# Patient Record
Sex: FEMALE | Race: WHITE | NOT HISPANIC OR LATINO | Employment: FULL TIME | URBAN - METROPOLITAN AREA
[De-identification: names, ages, dates, MRNs, and addresses within clinical notes are randomized per-mention and may not be internally consistent; named-entity substitution may affect disease eponyms.]

---

## 2024-11-22 ENCOUNTER — HOSPITAL ENCOUNTER (OUTPATIENT)
Facility: HOSPITAL | Age: 44
Discharge: HOME | End: 2024-11-22
Attending: EMERGENCY MEDICINE | Admitting: EMERGENCY MEDICINE
Payer: MEDICAID

## 2024-11-22 ENCOUNTER — APPOINTMENT (OUTPATIENT)
Dept: GENERAL RADIOLOGY | Facility: HOSPITAL | Age: 44
End: 2024-11-22
Payer: MEDICAID

## 2024-11-22 VITALS
BODY MASS INDEX: 24.91 KG/M2 | TEMPERATURE: 99 F | OXYGEN SATURATION: 98 % | WEIGHT: 155 LBS | RESPIRATION RATE: 20 BRPM | SYSTOLIC BLOOD PRESSURE: 115 MMHG | HEART RATE: 117 BPM | DIASTOLIC BLOOD PRESSURE: 79 MMHG | HEIGHT: 66 IN

## 2024-11-22 DIAGNOSIS — J06.9 VIRAL URI WITH COUGH: Primary | ICD-10-CM

## 2024-11-22 LAB
FLUAV SUBTYP SPEC NAA+PROBE: NOT DETECTED
FLUBV RNA ISLT QL NAA+PROBE: NOT DETECTED
SARS-COV-2 RNA RESP QL NAA+PROBE: NOT DETECTED
STREP A PCR: NOT DETECTED

## 2024-11-22 PROCEDURE — 71046 X-RAY EXAM CHEST 2 VIEWS: CPT

## 2024-11-22 PROCEDURE — 87651 STREP A DNA AMP PROBE: CPT

## 2024-11-22 PROCEDURE — 87636 SARSCOV2 & INF A&B AMP PRB: CPT | Performed by: EMERGENCY MEDICINE

## 2024-11-22 PROCEDURE — G0463 HOSPITAL OUTPT CLINIC VISIT: HCPCS

## 2024-11-22 RX ORDER — ACETAMINOPHEN 500 MG
1000 TABLET ORAL ONCE
Status: COMPLETED | OUTPATIENT
Start: 2024-11-22 | End: 2024-11-22

## 2024-11-22 RX ORDER — BENZONATATE 100 MG/1
100 CAPSULE ORAL 3 TIMES DAILY PRN
Qty: 12 CAPSULE | Refills: 0 | Status: SHIPPED | OUTPATIENT
Start: 2024-11-22

## 2024-11-22 RX ADMIN — ACETAMINOPHEN 1000 MG: 500 TABLET, FILM COATED ORAL at 18:07

## 2024-11-22 NOTE — Clinical Note
[place] [address] [place] [address] [place] [address]  Phone: [phone]    Bhavna Jaime was seen and treated in our emergency department on 11/22/2024.  She may return to work on 11/25/2024.         Thank you for choosing [facility].    Citlali Sprague APRN

## 2024-11-22 NOTE — Clinical Note
[place] [address] [place] [address] [place] [address]  Phone: [phone]    Bhavna Jaime was seen and treated in our emergency department on 11/22/2024.  She may return to work on 11/25/2024.         Thank you for choosing [facility].    Citlali Sprague APRN

## 2024-11-23 NOTE — FSED PROVIDER NOTE
[facility]    EMERGENCY DEPARTMENT ENCOUNTER    Room Number:  12/12  Date seen:  11/22/2024  Time seen: 19:24 EST  PCP: Provider, No Known  Historian: patient    HPI:  Chief complaint:cough  Context:Bhavna Jaime is a 44 y.o. female who presents to the ED with c/o cough. The patient states that she has been having a cough with headache. She states that this has been going on for the last 3 days. She denies any known fever. She states that she has had some body aches and chills. The patient denies any chest pain or shortness of breath. The patient is nontoxic in appearance. She states that she has been eating and drinking normally.     Timing:constant  Duration: 3 days  Intensity/Severity:moderate  Associated Symptoms:cough, headache      MEDICAL RECORD REVIEW      ALLERGIES  Patient has no known allergies.    PAST MEDICAL HISTORY  Active Ambulatory Problems     Diagnosis Date Noted    No Active Ambulatory Problems     Resolved Ambulatory Problems     Diagnosis Date Noted    No Resolved Ambulatory Problems     No Additional Past Medical History       PAST SURGICAL HISTORY  History reviewed. No pertinent surgical history.    FAMILY HISTORY  History reviewed. No pertinent family history.    SOCIAL HISTORY  Social History     Socioeconomic History    Marital status: Single   Tobacco Use    Smoking status: Never    Smokeless tobacco: Current   Substance and Sexual Activity    Alcohol use: Never    Drug use: Never       REVIEW OF SYSTEMS  Review of Systems    All systems reviewed and negative except for those discussed in HPI.     PHYSICAL EXAM    I have reviewed the triage vital signs and nursing notes.    ED Triage Vitals [11/22/24 1744]   Temp Heart Rate Resp BP SpO2   99 °F (37.2 °C) (!) 125 18 127/81 94 %      Temp src Heart Rate Source Patient Position BP Location FiO2 (%)   Oral -- -- -- --       Physical Exam  Constitutional:       Appearance: Normal appearance. She is not  toxic-appearing.   HENT:      Right Ear: Tympanic membrane and ear canal normal.      Left Ear: Tympanic membrane and ear canal normal.      Nose: Nose normal.      Mouth/Throat:      Mouth: Mucous membranes are moist.      Pharynx: Oropharynx is clear.   Eyes:      Extraocular Movements: Extraocular movements intact.      Conjunctiva/sclera: Conjunctivae normal.      Pupils: Pupils are equal, round, and reactive to light.   Cardiovascular:      Rate and Rhythm: Normal rate and regular rhythm.      Pulses: Normal pulses.      Heart sounds: Normal heart sounds.   Pulmonary:      Effort: Pulmonary effort is normal.      Breath sounds: Normal breath sounds.   Musculoskeletal:         General: Normal range of motion.      Cervical back: Normal range of motion.   Skin:     General: Skin is warm.   Neurological:      General: No focal deficit present.      Mental Status: She is alert.   Psychiatric:         Mood and Affect: Mood normal.         Behavior: Behavior normal.         Vital signs and nursing notes reviewed.        LAB RESULTS  Recent Results (from the past 24 hours)   COVID-19 and FLU A/B PCR, 1 HR TAT - Swab, Nasopharynx    Collection Time: 11/22/24  5:45 PM    Specimen: Nasopharynx; Swab   Result Value Ref Range    COVID19 Not Detected Not Detected - Ref. Range    Influenza A PCR Not Detected Not Detected    Influenza B PCR Not Detected Not Detected   Rapid Strep A Screen - Swab, Throat    Collection Time: 11/22/24  5:45 PM    Specimen: Throat; Swab   Result Value Ref Range    STREP A PCR Not Detected Not Detected       Ordered the above labs and independently reviewed the results.      RADIOLOGY RESULTS  XR Chest PA & Lateral    Result Date: 11/22/2024  XR CHEST PA AND LATERAL Date of Exam: 11/22/2024 6:18 PM EST Indication: cough Comparison: None available. Findings: Lungs are clear without focal consolidation, overt edema, large effusion or pneumothorax. Heart size within normal limits. Osseous structures  are grossly unremarkable.      No acute cardiopulmonary abnormality. Electronically Signed: Brayan Hernandez MD  11/22/2024 6:48 PM EST  Workstation ID: [id number]        I ordered the above noted radiological studies. Independently reviewed by me and discussed with radiologist.  See dictation above for official radiology interpretation.      Orders placed during this visit:  Orders Placed This Encounter   Procedures    COVID-19 and FLU A/B PCR, 1 HR TAT - Swab, Nasopharynx    Rapid Strep A Screen - Swab, Throat    XR Chest PA & Lateral    Recheck vitals  Nursing Communication           PROCEDURES    Procedures        MEDICATIONS GIVEN IN ER    Medications   acetaminophen (TYLENOL) tablet 1,000 mg (1,000 mg Oral Given 11/22/24 1807)         PROGRESS, DATA ANALYSIS, CONSULTS, AND MEDICAL DECISION MAKING    All labs and radiology studies have been independently reviewed by me.     ED Course as of 11/22/24 1929 Fri Nov 22, 2024   1808 STREP A PCR: Not Detected [KJ]   1808 COVID19: Not Detected [KJ]   1808 Influenza A PCR: Not Detected [KJ]   1808 Influenza B PCR: Not Detected [KJ]      ED Course User Index  [KJ] Citlali Sprague, OSCAR       AS OF 19:29 EST VITALS:    BP - 115/79  HR - 117  TEMP - 99 °F (37.2 °C) (Oral)  02 SATS - 98%    Medical Decision Making  Symptoms suspicious for likely viral upper respiratory infection. Differential includes bacterial pneumonia, sinusitis, allergic rhinitis, covid, influenza. Do not suspect underlying cardiopulmonary process. I considered, but think unlikely, dangerous causes of this patient´s symptoms to include pneumonia, pneumothorax. Patient is nontoxic appearing and not in need of emergent medical intervention. Patient will be sent home with tessalon perles. She was given follow up instructions and return precautions with understanding      Problems Addressed:  Viral URI with cough: complicated acute illness or injury    Amount and/or Complexity of Data Reviewed  Labs:   Decision-making details documented in ED Course.  Radiology: ordered.    Risk  OTC drugs.  Prescription drug management.          DIAGNOSIS  Final diagnoses:   Viral URI with cough       New Medications Ordered This Visit   Medications    acetaminophen (TYLENOL) tablet 1,000 mg    benzonatate (TESSALON) 100 MG capsule     Sig: Take 1 capsule by mouth 3 (Three) Times a Day As Needed for Cough.     Dispense:  12 capsule     Refill:  0           I performed hand hygiene on entry into the pt room and upon exit.      Part of this note may be an electronic transcription/translation of spoken language to printed text using the Dragon Dictation System.     Appropriate PPE worn during exam.    Dictated utilizing Dragon dictation     Note Disclaimer: At [facility], we believe that sharing information builds trust and better relationships. You are receiving this note because you recently visited [facility]. It is possible you will see health information before a provider has talked with you about it. This kind of information can be easy to misunderstand. To help you fully understand what it means for your health, we urge you to discuss this note with your provider.

## 2024-11-23 NOTE — DISCHARGE INSTRUCTIONS
Drink plenty fluids and get rest.  You can use over-the-counter medications as needed for your symptoms.  Follow-up with your primary care provider.  Return for any new or worsening symptoms.    Wash/sanitize common household surfaces with antibacterial wipes.  Especially door knobs, light switches. Change bed linens and wash bath towels/washcloths. Frequent handwashing. Cough/sneeze into your sleeve. Treat fever every 6-8 hours with adult/children Tylenol (generic acetaminophen)